# Patient Record
Sex: FEMALE | Race: WHITE | NOT HISPANIC OR LATINO | Employment: FULL TIME | URBAN - METROPOLITAN AREA
[De-identification: names, ages, dates, MRNs, and addresses within clinical notes are randomized per-mention and may not be internally consistent; named-entity substitution may affect disease eponyms.]

---

## 2017-11-09 ENCOUNTER — GENERIC CONVERSION - ENCOUNTER (OUTPATIENT)
Dept: OTHER | Facility: OTHER | Age: 27
End: 2017-11-09

## 2017-12-26 ENCOUNTER — GENERIC CONVERSION - ENCOUNTER (OUTPATIENT)
Dept: OTHER | Facility: OTHER | Age: 27
End: 2017-12-26

## 2018-01-22 VITALS
TEMPERATURE: 98.3 F | HEIGHT: 62 IN | BODY MASS INDEX: 30.57 KG/M2 | DIASTOLIC BLOOD PRESSURE: 60 MMHG | HEART RATE: 75 BPM | SYSTOLIC BLOOD PRESSURE: 120 MMHG | WEIGHT: 166.13 LBS

## 2018-01-24 VITALS
TEMPERATURE: 99.9 F | HEIGHT: 62 IN | WEIGHT: 164 LBS | SYSTOLIC BLOOD PRESSURE: 118 MMHG | BODY MASS INDEX: 30.18 KG/M2 | DIASTOLIC BLOOD PRESSURE: 72 MMHG

## 2019-05-08 ENCOUNTER — OFFICE VISIT (OUTPATIENT)
Dept: FAMILY MEDICINE CLINIC | Facility: CLINIC | Age: 29
End: 2019-05-08
Payer: COMMERCIAL

## 2019-05-08 VITALS
SYSTOLIC BLOOD PRESSURE: 124 MMHG | BODY MASS INDEX: 30.73 KG/M2 | HEIGHT: 62 IN | WEIGHT: 167 LBS | DIASTOLIC BLOOD PRESSURE: 82 MMHG | TEMPERATURE: 98.8 F

## 2019-05-08 DIAGNOSIS — H61.23 IMPACTED CERUMEN OF BOTH EARS: Primary | ICD-10-CM

## 2019-05-08 PROBLEM — Z01.419 ENCOUNTER FOR GYNECOLOGICAL EXAMINATION WITHOUT ABNORMAL FINDING: Status: ACTIVE | Noted: 2017-08-03

## 2019-05-08 PROBLEM — J06.9 ACUTE UPPER RESPIRATORY INFECTION: Status: ACTIVE | Noted: 2017-12-26

## 2019-05-08 PROCEDURE — 99213 OFFICE O/P EST LOW 20 MIN: CPT | Performed by: FAMILY MEDICINE

## 2019-05-15 ENCOUNTER — OFFICE VISIT (OUTPATIENT)
Dept: FAMILY MEDICINE CLINIC | Facility: CLINIC | Age: 29
End: 2019-05-15
Payer: COMMERCIAL

## 2019-05-15 VITALS
WEIGHT: 166 LBS | SYSTOLIC BLOOD PRESSURE: 128 MMHG | HEIGHT: 62 IN | BODY MASS INDEX: 30.55 KG/M2 | TEMPERATURE: 98.3 F | DIASTOLIC BLOOD PRESSURE: 80 MMHG

## 2019-05-15 DIAGNOSIS — H61.23 IMPACTED CERUMEN OF BOTH EARS: ICD-10-CM

## 2019-05-15 DIAGNOSIS — L98.9 CHANGING SKIN LESION: Primary | ICD-10-CM

## 2019-05-15 PROCEDURE — 99213 OFFICE O/P EST LOW 20 MIN: CPT | Performed by: FAMILY MEDICINE

## 2019-05-15 PROCEDURE — 3008F BODY MASS INDEX DOCD: CPT | Performed by: FAMILY MEDICINE

## 2019-05-15 PROCEDURE — 11300 SHAVE SKIN LESION 0.5 CM/<: CPT | Performed by: FAMILY MEDICINE

## 2019-05-16 PROCEDURE — 88304 TISSUE EXAM BY PATHOLOGIST: CPT | Performed by: PATHOLOGY

## 2019-08-20 ENCOUNTER — OFFICE VISIT (OUTPATIENT)
Dept: FAMILY MEDICINE CLINIC | Facility: CLINIC | Age: 29
End: 2019-08-20
Payer: COMMERCIAL

## 2019-08-20 VITALS
DIASTOLIC BLOOD PRESSURE: 72 MMHG | HEIGHT: 62 IN | HEART RATE: 87 BPM | SYSTOLIC BLOOD PRESSURE: 128 MMHG | BODY MASS INDEX: 31.28 KG/M2 | WEIGHT: 170 LBS | RESPIRATION RATE: 99 BRPM

## 2019-08-20 DIAGNOSIS — H65.23 BILATERAL CHRONIC SEROUS OTITIS MEDIA: Primary | ICD-10-CM

## 2019-08-20 PROCEDURE — 99213 OFFICE O/P EST LOW 20 MIN: CPT | Performed by: FAMILY MEDICINE

## 2019-08-20 PROCEDURE — 3008F BODY MASS INDEX DOCD: CPT | Performed by: FAMILY MEDICINE

## 2019-08-20 NOTE — PROGRESS NOTES
Assessment/Plan:    78-year-old woman with:  Serous TM effusion  Symptoms great to her left than right  Encouraged patient to restart her Flonase and to use times 2-3 weeks and to call back if not improving or worsening    No problem-specific Assessment & Plan notes found for this encounter  Diagnoses and all orders for this visit:    Bilateral chronic serous otitis media          Subjective:      Patient ID: Cirilo Hallman is a 30 y o  female  Patient is a 78-year-old woman who presents for follow-up on left ear infection  She admits that she was given antibiotics from the urgent care and she feels about 80% better but does get some popping and block sensation still on her left ear  No fevers chills nausea vomiting  The following portions of the patient's history were reviewed and updated as appropriate: allergies, current medications, past family history, past medical history, past social history, past surgical history and problem list     Review of Systems   Constitutional: Negative  HENT: Positive for hearing loss  Eyes: Negative  Respiratory: Negative  Cardiovascular: Negative  Gastrointestinal: Negative  Endocrine: Negative  Genitourinary: Negative  Musculoskeletal: Negative  Allergic/Immunologic: Negative  Neurological: Negative  Hematological: Negative  Psychiatric/Behavioral: Negative  All other systems reviewed and are negative  Objective:      /72 (BP Location: Right arm, Patient Position: Sitting, Cuff Size: Standard)   Pulse 87   Resp (!) 99   Ht 5' 2" (1 575 m)   Wt 77 1 kg (170 lb)   LMP 07/20/2019   BMI 31 09 kg/m²          Physical Exam   Constitutional: She is oriented to person, place, and time  She appears well-developed and well-nourished  HENT:   Head: Atraumatic     Right Ear: External ear normal    Left Ear: External ear normal    Serous TM effusion left greater than right   Eyes: Pupils are equal, round, and reactive to light  Conjunctivae and EOM are normal    Neck: Normal range of motion  Pulmonary/Chest: Effort normal  No respiratory distress  Abdominal: She exhibits no distension  Musculoskeletal: Normal range of motion  Neurological: She is alert and oriented to person, place, and time  No cranial nerve deficit  Skin: Skin is warm and dry  Psychiatric: She has a normal mood and affect  Her behavior is normal  Judgment and thought content normal          BMI Counseling: Body mass index is 31 09 kg/m²  Discussed the patient's BMI with her  The BMI is above average  BMI counseling and education was provided to the patient  Nutrition recommendations include 3-5 servings of fruits/vegetables daily  Exercise recommendations include moderate aerobic physical activity for 150 minutes/week

## 2019-10-13 ENCOUNTER — HOSPITAL ENCOUNTER (OUTPATIENT)
Dept: MRI IMAGING | Facility: HOSPITAL | Age: 29
Discharge: HOME | End: 2019-10-13
Attending: SPECIALIST
Payer: COMMERCIAL

## 2019-10-13 DIAGNOSIS — H93.19 TINNITUS: ICD-10-CM

## 2019-10-13 DIAGNOSIS — H93.A1 PULSATILE TINNITUS OF RIGHT EAR: ICD-10-CM

## 2019-10-13 PROCEDURE — 70544 MR ANGIOGRAPHY HEAD W/O DYE: CPT

## 2019-10-13 PROCEDURE — 70547 MR ANGIOGRAPHY NECK W/O DYE: CPT

## 2019-10-13 PROCEDURE — A9585 GADOBUTROL INJECTION: HCPCS | Performed by: SPECIALIST

## 2019-10-13 PROCEDURE — 70553 MRI BRAIN STEM W/O & W/DYE: CPT

## 2019-10-13 RX ADMIN — GADOBUTROL 8 ML: 604.72 INJECTION INTRAVENOUS at 10:54

## 2020-12-18 ENCOUNTER — TELEPHONE (OUTPATIENT)
Dept: POSTPARTUM | Facility: CLINIC | Age: 30
End: 2020-12-18

## 2020-12-23 ENCOUNTER — OFFICE VISIT (OUTPATIENT)
Dept: POSTPARTUM | Facility: CLINIC | Age: 30
End: 2020-12-23
Payer: COMMERCIAL

## 2020-12-23 VITALS — SYSTOLIC BLOOD PRESSURE: 114 MMHG | DIASTOLIC BLOOD PRESSURE: 72 MMHG

## 2020-12-23 DIAGNOSIS — Z71.89 ENCOUNTER FOR BREAST FEEDING COUNSELING: Primary | ICD-10-CM

## 2020-12-23 PROCEDURE — 99404 PREV MED CNSL INDIV APPRX 60: CPT | Performed by: PEDIATRICS

## 2020-12-23 RX ORDER — MULTIVIT WITH MINERALS/LUTEIN
250 TABLET ORAL DAILY
COMMUNITY
Start: 2020-12-04 | End: 2021-12-04

## 2020-12-23 RX ORDER — CALCIUM CARB/VITAMIN D3/VIT K1 650MG-12.5
2 TABLET,CHEWABLE ORAL DAILY
COMMUNITY

## 2020-12-23 RX ORDER — FERROUS SULFATE 325(65) MG
325 TABLET ORAL DAILY
COMMUNITY
Start: 2020-12-04 | End: 2021-12-04

## 2020-12-31 ENCOUNTER — OFFICE VISIT (OUTPATIENT)
Dept: POSTPARTUM | Facility: CLINIC | Age: 30
End: 2020-12-31
Payer: COMMERCIAL

## 2020-12-31 DIAGNOSIS — Z71.89 ENCOUNTER FOR BREAST FEEDING COUNSELING: Primary | ICD-10-CM

## 2020-12-31 PROCEDURE — 99404 PREV MED CNSL INDIV APPRX 60: CPT | Performed by: PEDIATRICS

## 2020-12-31 RX ORDER — ACETAMINOPHEN AND CODEINE PHOSPHATE 120; 12 MG/5ML; MG/5ML
0.35 SOLUTION ORAL DAILY
COMMUNITY
Start: 2020-12-31 | End: 2021-12-31

## 2021-02-10 ENCOUNTER — TELEPHONE (OUTPATIENT)
Dept: POSTPARTUM | Facility: CLINIC | Age: 31
End: 2021-02-10

## 2021-02-10 NOTE — TELEPHONE ENCOUNTER
Evelyn Moses has been taking a supplement to help with her milk supply  She has started taking an OCP that does not contain estrogen  She has found information about the supplement she is taking impacting estrogen and wants to know if she should be concerned about the effectiveness of her OCP if she continues taking these supplements  I discussed with Evelyn Moses that there is no conclusive evidence about how supplements may impact the efficacy of her OCP's  I explained that even though her OCP does not contain estrogens, if the supplements she is taking alters her body's estrogens potentially this could impact how her medication is working  I reviewed with her that it is well researched and documented that antibiotics can decrease the effectiveness of her OCP's and encouraged her to use alternative forms of contraception if she is ever treated with antibiotics  I encourage Evelyn Moses to call with any additional questions or concerns

## 2021-02-10 NOTE — TELEPHONE ENCOUNTER
Anais Rubalcava called - she is asking if there are any lactation supplements that would interfere with the mini BC pill

## 2021-02-11 ENCOUNTER — TELEPHONE (OUTPATIENT)
Dept: FAMILY MEDICINE CLINIC | Facility: CLINIC | Age: 31
End: 2021-02-11

## 2021-03-24 ENCOUNTER — TELEPHONE (OUTPATIENT)
Dept: POSTPARTUM | Facility: CLINIC | Age: 31
End: 2021-03-24

## 2021-03-24 NOTE — TELEPHONE ENCOUNTER
Called Jennifer - Provided info from CDC about COVID and breastfeeding - encouraged to get vaccine as it is considered safe

## 2021-03-24 NOTE — TELEPHONE ENCOUNTER
Hira Mitchell called - looking for information on getting the Covid vaccine while still breastfeeding  She is scheduled for this Friday

## 2021-03-31 DIAGNOSIS — Z23 ENCOUNTER FOR IMMUNIZATION: ICD-10-CM

## 2021-10-20 ENCOUNTER — OFFICE VISIT (OUTPATIENT)
Dept: URGENT CARE | Facility: MEDICAL CENTER | Age: 31
End: 2021-10-20
Payer: COMMERCIAL

## 2021-10-20 ENCOUNTER — TELEPHONE (OUTPATIENT)
Dept: URGENT CARE | Facility: MEDICAL CENTER | Age: 31
End: 2021-10-20

## 2021-10-20 VITALS
WEIGHT: 156 LBS | SYSTOLIC BLOOD PRESSURE: 110 MMHG | BODY MASS INDEX: 28.53 KG/M2 | DIASTOLIC BLOOD PRESSURE: 72 MMHG | TEMPERATURE: 97.9 F | OXYGEN SATURATION: 96 % | HEART RATE: 93 BPM | RESPIRATION RATE: 18 BRPM

## 2021-10-20 DIAGNOSIS — J02.9 SORE THROAT: Primary | ICD-10-CM

## 2021-10-20 LAB
S PYO AG THROAT QL: NEGATIVE
SARS-COV-2 RNA RESP QL NAA+PROBE: NEGATIVE

## 2021-10-20 PROCEDURE — G0382 LEV 3 HOSP TYPE B ED VISIT: HCPCS | Performed by: PHYSICIAN ASSISTANT

## 2021-10-20 PROCEDURE — U0003 INFECTIOUS AGENT DETECTION BY NUCLEIC ACID (DNA OR RNA); SEVERE ACUTE RESPIRATORY SYNDROME CORONAVIRUS 2 (SARS-COV-2) (CORONAVIRUS DISEASE [COVID-19]), AMPLIFIED PROBE TECHNIQUE, MAKING USE OF HIGH THROUGHPUT TECHNOLOGIES AS DESCRIBED BY CMS-2020-01-R: HCPCS | Performed by: PHYSICIAN ASSISTANT

## 2021-10-20 PROCEDURE — 87880 STREP A ASSAY W/OPTIC: CPT | Performed by: PHYSICIAN ASSISTANT

## 2021-10-20 PROCEDURE — 87070 CULTURE OTHR SPECIMN AEROBIC: CPT | Performed by: PHYSICIAN ASSISTANT

## 2021-10-20 PROCEDURE — U0005 INFEC AGEN DETEC AMPLI PROBE: HCPCS | Performed by: PHYSICIAN ASSISTANT

## 2021-10-21 ENCOUNTER — TELEPHONE (OUTPATIENT)
Dept: URGENT CARE | Facility: CLINIC | Age: 31
End: 2021-10-21

## 2021-10-22 LAB — BACTERIA THROAT CULT: NORMAL

## 2022-11-08 ENCOUNTER — TELEPHONE (OUTPATIENT)
Dept: FAMILY MEDICINE CLINIC | Facility: CLINIC | Age: 32
End: 2022-11-08

## 2022-11-08 NOTE — TELEPHONE ENCOUNTER
----- Message from Bambi Limon MA sent at 11/2/2022  8:12 AM EDT -----  Regarding: RE: care gap request  Radha Gallegos Please send as a telephone encounter with "Patient Attribution" as reason for call  Thanks  ----- Message -----  From: Keturah Chi  Sent: 11/2/2022   7:18 AM EDT  To: [facility]  Subject: care gap request                                 11/02/22 7:17 AM    Hello, Patient is no longer seeing Dr Pool PCP needs to be removed      Thank you,  Keturah Chi  [facility]

## 2022-11-08 NOTE — TELEPHONE ENCOUNTER
----- Message from Marquita Powell PA-C sent at 10/4/2022  9:16 AM EDT -----  Regarding: Last seen 08/20/2019  Please update PCP for Dr Jens Tom since the patient is no longer considered established since last seen 08/20/2019    Thank you

## 2023-12-04 ENCOUNTER — TELEPHONE (OUTPATIENT)
Dept: FAMILY MEDICINE CLINIC | Facility: CLINIC | Age: 33
End: 2023-12-04

## 2023-12-05 ENCOUNTER — OFFICE VISIT (OUTPATIENT)
Dept: FAMILY MEDICINE CLINIC | Facility: CLINIC | Age: 33
End: 2023-12-05
Payer: COMMERCIAL

## 2023-12-05 VITALS
BODY MASS INDEX: 33.31 KG/M2 | HEIGHT: 62 IN | TEMPERATURE: 99.2 F | OXYGEN SATURATION: 99 % | WEIGHT: 181 LBS | RESPIRATION RATE: 16 BRPM | SYSTOLIC BLOOD PRESSURE: 122 MMHG | DIASTOLIC BLOOD PRESSURE: 80 MMHG | HEART RATE: 68 BPM

## 2023-12-05 DIAGNOSIS — L72.0 EPIDERMAL CYST: Primary | ICD-10-CM

## 2023-12-05 DIAGNOSIS — Z00.00 ROUTINE ADULT HEALTH MAINTENANCE: ICD-10-CM

## 2023-12-05 PROCEDURE — 99213 OFFICE O/P EST LOW 20 MIN: CPT | Performed by: FAMILY MEDICINE

## 2023-12-05 PROCEDURE — 99395 PREV VISIT EST AGE 18-39: CPT | Performed by: FAMILY MEDICINE

## 2023-12-05 RX ORDER — CEPHALEXIN 500 MG/1
500 CAPSULE ORAL EVERY 8 HOURS SCHEDULED
Qty: 21 CAPSULE | Refills: 0 | Status: SHIPPED | OUTPATIENT
Start: 2023-12-05 | End: 2023-12-12

## 2023-12-07 NOTE — PROGRESS NOTES
Assessment/Plan:    25 y/o woman with: cyst with annual well visit. Will treat with keflex, pt plans to think about referral to surgeon. Discussed supportive care and return parameters. Regarding Annual well visit. Discussed various safety and health maintenance issues including healthy diet like the Mediterranean diet, exercise, ample sleep, stress reduction, and healthy weight as tolerated. Discussed supportive care and return parameters. No problem-specific Assessment & Plan notes found for this encounter. Diagnoses and all orders for this visit:    Epidermal cyst  -     cephalexin (KEFLEX) 500 mg capsule; Take 1 capsule (500 mg total) by mouth every 8 (eight) hours for 7 days  -     CBC and differential; Future  -     Comprehensive metabolic panel; Future  -     TSH, 3rd generation with Free T4 reflex; Future  -     Lipid Panel with Direct LDL reflex; Future  -     Apolipoprotein B; Future    Routine adult health maintenance  -     CBC and differential; Future  -     Comprehensive metabolic panel; Future  -     TSH, 3rd generation with Free T4 reflex; Future  -     Lipid Panel with Direct LDL reflex; Future  -     Apolipoprotein B; Future          Subjective:     Chief Complaint   Patient presents with    Physical Exam    Back     Patient has lump on back. Patient states has been on back for years, Patient states that lump started to burn and hurt on Friday. JT    BMI      Due. Patient ID: Jackqulyn Sicard is a 26 y.o. female. Patient is a 25 y/o woman who presents complaining of raised lesion on her back, now tender. No drainage. No fevers chills nausea or vomiting. Pt also here for annual well visit admits being active eats well denies acute complaints.         The following portions of the patient's history were reviewed and updated as appropriate: allergies, current medications, past family history, past medical history, past social history, past surgical history and problem list.    Review of Systems   Constitutional: Negative. HENT: Negative. Eyes: Negative. Respiratory: Negative. Cardiovascular: Negative. Gastrointestinal: Negative. Endocrine: Negative. Genitourinary: Negative. Musculoskeletal: Negative. Allergic/Immunologic: Negative. Neurological: Negative. Hematological: Negative. Psychiatric/Behavioral: Negative. All other systems reviewed and are negative. Objective:      /80 (BP Location: Left arm, Patient Position: Sitting, Cuff Size: Standard)   Pulse 68   Temp 99.2 °F (37.3 °C) (Tympanic)   Resp 16   Ht 5' 2" (1.575 m)   Wt 82.1 kg (181 lb)   SpO2 99%   BMI 33.11 kg/m²          Physical Exam  Constitutional:       Appearance: She is well-developed. HENT:      Head: Atraumatic. Right Ear: External ear normal.      Left Ear: External ear normal.   Eyes:      Conjunctiva/sclera: Conjunctivae normal.      Pupils: Pupils are equal, round, and reactive to light. Cardiovascular:      Rate and Rhythm: Normal rate and regular rhythm. Heart sounds: Normal heart sounds. Pulmonary:      Effort: Pulmonary effort is normal. No respiratory distress. Breath sounds: Normal breath sounds. Abdominal:      General: Bowel sounds are normal. There is no distension. Palpations: Abdomen is soft. Tenderness: There is no abdominal tenderness. There is no guarding or rebound. Musculoskeletal:         General: Normal range of motion. Cervical back: Normal range of motion. Skin:     General: Skin is warm and dry. Comments: 2cm tender raised lesion upper back   Neurological:      Mental Status: She is alert and oriented to person, place, and time. Cranial Nerves: No cranial nerve deficit. Psychiatric:         Behavior: Behavior normal.         Thought Content: Thought content normal.         Judgment: Judgment normal.         BMI Counseling: Body mass index is 33.11 kg/m².  The BMI is above normal. Nutrition recommendations include encouraging healthy choices of fruits and vegetables. Exercise recommendations include moderate physical activity 150 minutes/week. Rationale for BMI follow-up plan is due to patient being overweight or obese. Depression Screening and Follow-up Plan: Patient was screened for depression during today's encounter. They screened negative with a PHQ-2 score of 1.

## 2023-12-13 ENCOUNTER — OFFICE VISIT (OUTPATIENT)
Dept: FAMILY MEDICINE CLINIC | Facility: CLINIC | Age: 33
End: 2023-12-13
Payer: COMMERCIAL

## 2023-12-13 VITALS
WEIGHT: 183.2 LBS | DIASTOLIC BLOOD PRESSURE: 102 MMHG | RESPIRATION RATE: 21 BRPM | TEMPERATURE: 98.7 F | HEART RATE: 75 BPM | HEIGHT: 62 IN | BODY MASS INDEX: 33.71 KG/M2 | OXYGEN SATURATION: 99 % | SYSTOLIC BLOOD PRESSURE: 150 MMHG

## 2023-12-13 DIAGNOSIS — L72.0 EPIDERMAL CYST: Primary | ICD-10-CM

## 2023-12-13 PROCEDURE — 99213 OFFICE O/P EST LOW 20 MIN: CPT | Performed by: FAMILY MEDICINE

## 2023-12-13 PROCEDURE — 87205 SMEAR GRAM STAIN: CPT | Performed by: FAMILY MEDICINE

## 2023-12-13 PROCEDURE — 87070 CULTURE OTHR SPECIMN AEROBIC: CPT | Performed by: FAMILY MEDICINE

## 2023-12-13 RX ORDER — CEPHALEXIN 500 MG/1
500 CAPSULE ORAL 2 TIMES DAILY
Qty: 14 CAPSULE | Refills: 0 | Status: SHIPPED | OUTPATIENT
Start: 2023-12-13 | End: 2023-12-20

## 2023-12-13 RX ORDER — SULFAMETHOXAZOLE AND TRIMETHOPRIM 800; 160 MG/1; MG/1
1 TABLET ORAL 2 TIMES DAILY
Qty: 14 TABLET | Refills: 0 | Status: SHIPPED | OUTPATIENT
Start: 2023-12-13 | End: 2023-12-20

## 2023-12-14 ENCOUNTER — TELEPHONE (OUTPATIENT)
Dept: ADMINISTRATIVE | Facility: OTHER | Age: 33
End: 2023-12-14

## 2023-12-14 NOTE — TELEPHONE ENCOUNTER
Upon review of the In Basket request we were able to locate, review, and update the patient chart as requested for Pap Smear (HPV) aka Cervical Cancer Screening. Any additional questions or concerns should be emailed to the Practice Liaisons via the appropriate education email address, please do not reply via In Basket.     Thank you  Natividad De MA

## 2023-12-14 NOTE — TELEPHONE ENCOUNTER
----- Message from Kerri De La Rosa sent at 12/13/2023  1:55 PM EST -----  Regarding: Update HM (PAP)  Can you please update patient's HM regarding PAP? Results are under labs tab from 9/2023. Thank you.

## 2023-12-18 ENCOUNTER — CONSULT (OUTPATIENT)
Dept: SURGERY | Facility: CLINIC | Age: 33
End: 2023-12-18
Payer: COMMERCIAL

## 2023-12-18 VITALS
TEMPERATURE: 97.7 F | HEIGHT: 62 IN | SYSTOLIC BLOOD PRESSURE: 140 MMHG | HEART RATE: 76 BPM | BODY MASS INDEX: 33.31 KG/M2 | WEIGHT: 181 LBS | DIASTOLIC BLOOD PRESSURE: 88 MMHG

## 2023-12-18 DIAGNOSIS — L72.0 EPIDERMAL CYST: ICD-10-CM

## 2023-12-18 PROBLEM — Z00.00 ROUTINE ADULT HEALTH MAINTENANCE: Status: RESOLVED | Noted: 2023-12-05 | Resolved: 2023-12-18

## 2023-12-18 PROBLEM — Z01.419 ENCOUNTER FOR GYNECOLOGICAL EXAMINATION WITHOUT ABNORMAL FINDING: Status: RESOLVED | Noted: 2017-08-03 | Resolved: 2023-12-18

## 2023-12-18 LAB
BACTERIA WND AEROBE CULT: NO GROWTH
GRAM STN SPEC: NORMAL
GRAM STN SPEC: NORMAL

## 2023-12-18 PROCEDURE — 99203 OFFICE O/P NEW LOW 30 MIN: CPT | Performed by: PHYSICIAN ASSISTANT

## 2023-12-18 NOTE — RESULT ENCOUNTER NOTE
Please call patient. Wound cx was negative, if pt is not responding to antibiotics she should call back

## 2023-12-18 NOTE — PROGRESS NOTES
Assessment/Plan:    34 y/o woman with: infected cyst. Pt requested additional drainage if possible. Discussed treatment options, ethyl chloride spray was used topically and lancing by 11 blade was performed and more purulent drainage was expressed. Will add Bactrim to antibiotic. Discussed supportive care and return parameters.     No problem-specific Assessment & Plan notes found for this encounter.       Diagnoses and all orders for this visit:    Epidermal cyst  -     sulfamethoxazole-trimethoprim (BACTRIM DS) 800-160 mg per tablet; Take 1 tablet by mouth 2 (two) times a day for 7 days  -     cephalexin (KEFLEX) 500 mg capsule; Take 1 capsule (500 mg total) by mouth 2 (two) times a day for 7 days  -     Ambulatory Referral to General Surgery; Future  -     Cancel: Culture, Aerobic and Anaerobic w/Gram Stain; Future  -     Cancel: Culture, Aerobic and Anaerobic w/Gram Stain; Future  -     Wound culture and Gram stain          Subjective:     Chief Complaint   Patient presents with    Cyst     Mid-upper back. Has become infected, draining and painful x 3-4 days.   Patient has been using heat to extract pus. Also been using advil to help with pain.        Patient ID: Jennifer Brewster is a 33 y.o. female.    Patient is a 34 y/o woman who presents for follow-up infected cyst that has gotten slightly more tender and has began draining spontaneously no fevers chills nausea or vomiting.        The following portions of the patient's history were reviewed and updated as appropriate: allergies, current medications, past family history, past medical history, past social history, past surgical history and problem list.    Review of Systems   Constitutional: Negative.    HENT: Negative.     Eyes: Negative.    Respiratory: Negative.     Cardiovascular: Negative.    Gastrointestinal: Negative.    Endocrine: Negative.    Genitourinary: Negative.    Musculoskeletal: Negative.    Skin:  Positive for wound.  "  Allergic/Immunologic: Negative.    Neurological: Negative.    Hematological: Negative.    Psychiatric/Behavioral: Negative.     All other systems reviewed and are negative.        Objective:      BP (!) 150/102 (BP Location: Left arm, Patient Position: Sitting, Cuff Size: Standard)   Pulse 75   Temp 98.7 °F (37.1 °C) (Temporal)   Resp 21   Ht 5' 2\" (1.575 m)   Wt 83.1 kg (183 lb 3.2 oz)   SpO2 99%   BMI 33.51 kg/m²          Physical Exam  Constitutional:       Appearance: She is well-developed.   HENT:      Head: Atraumatic.      Right Ear: External ear normal.      Left Ear: External ear normal.   Eyes:      Conjunctiva/sclera: Conjunctivae normal.      Pupils: Pupils are equal, round, and reactive to light.   Pulmonary:      Effort: Pulmonary effort is normal. No respiratory distress.   Abdominal:      General: There is no distension.   Musculoskeletal:         General: Normal range of motion.      Cervical back: Normal range of motion.   Skin:     General: Skin is warm and dry.      Findings: Lesion present.      Comments: 3cm erythematous infected cyst on upper back with spontaneous purulent drainage.   Neurological:      Mental Status: She is alert and oriented to person, place, and time.      Cranial Nerves: No cranial nerve deficit.   Psychiatric:         Behavior: Behavior normal.         Thought Content: Thought content normal.         Judgment: Judgment normal.         "

## 2023-12-18 NOTE — PROGRESS NOTES
Assessment/Plan:   Jennifer Brewster is a 33 y.o.female who is here for   Chief Complaint   Patient presents with    Cyst     Upper back by left shoulder blade. It has been there for years and got infected. It did pop and everything drained. Patient said now there is a hole in her back.          On exam found to have ruptured sebaceous cyst that has been thoroughly cleaned and looks great. It just needs to heal from the bottom up. I do not see any cyst wall so most likely this will heal nicely on its own without coming back.     Plan: The area where cyst was is very clean, some minor debris was removed from the bottom of the wound. Area is clean, no erythema, covered with a band-aid. We discussed wound care. Follow up PRN.        _______________________________________________________  CC:Cyst (Upper back by left shoulder blade. It has been there for years and got infected. It did pop and everything drained. Patient said now there is a hole in her back. )  .    HPI:  Jennifer Brewster is a 33 y.o.female who was referred for evaluation of Cyst (Upper back by left shoulder blade. It has been there for years and got infected. It did pop and everything drained. Patient said now there is a hole in her back. )  .    Currently patient reports patient states there was a rupture of the left back cyst where her mom debrided the cyst and had a lot removed. No antibiotics were taken. There has been some discharge but very minimal now. No pain and no fevers. No discharge on today's bandaging which was changed yesterday night. This has never opened prior.      ROS:  General ROS: negative  negative for - chills, fatigue, fever or night sweats, weight loss  Respiratory ROS: no cough, shortness of breath, or wheezing  Cardiovascular ROS: no chest pain or dyspnea on exertion  Genito-Urinary ROS: no dysuria, trouble voiding, or hematuria  Musculoskeletal ROS: negative for - gait disturbance, joint pain or muscle pain  Neurological  ROS: no TIA or stroke symptoms  Skin ROS: See HPI  GI ROS: see HPI  Skin ROS: no new rashes or lesions   Lymphatic ROS: no new adenopathy noted by pt.   Psy ROS: no new mental or behavioral disturbances       Patient Active Problem List   Diagnosis    Acute upper respiratory infection    Encounter for gynecological examination without abnormal finding    Impacted cerumen of both ears    Postcoital bleeding    Surveillance for birth control, oral contraceptives    Changing skin lesion    Bilateral chronic serous otitis media    Epidermal cyst    Routine adult health maintenance         Allergies:  Latex      Current Outpatient Medications:     Calcium-Vitamin D-Vitamin K (Viactiv Calcium Plus D) 650-12.5-40 MG-MCG-MCG CHEW, Chew 2 tablets daily, Disp: , Rfl:     cephalexin (KEFLEX) 500 mg capsule, Take 1 capsule (500 mg total) by mouth 2 (two) times a day for 7 days, Disp: 14 capsule, Rfl: 0    sulfamethoxazole-trimethoprim (BACTRIM DS) 800-160 mg per tablet, Take 1 tablet by mouth 2 (two) times a day for 7 days, Disp: 14 tablet, Rfl: 0    ascorbic acid (VITAMIN C) 250 mg tablet, Take 250 mg by mouth daily, Disp: , Rfl:     ferrous sulfate 325 (65 Fe) mg tablet, Take 325 mg by mouth daily, Disp: , Rfl:     norethindrone (MICRONOR) 0.35 MG tablet, Take 0.35 mg by mouth daily, Disp: , Rfl:     Past Medical History:   Diagnosis Date    Migraine     Otitis media 8/11/19    Tinnitus        Past Surgical History:   Procedure Laterality Date    DILATION AND CURETTAGE OF UTERUS      EYE SURGERY  12/2015    Lasik    LASIK      WISDOM TOOTH EXTRACTION         Family History   Problem Relation Age of Onset    Heart disease Father     Heart disease Maternal Grandfather     Liver cancer Paternal Grandmother     Cancer Paternal Grandmother         Pancreatic    Other Paternal Grandfather         Angioid streaks     Stroke Paternal Grandfather     Heart disease Family     Migraines Mother     Thyroid disease Mother          Hypothyroidism    Arthritis Mother     Hearing loss Mother     Arthritis Maternal Grandmother     Hearing loss Maternal Grandmother         reports that she has never smoked. She has never used smokeless tobacco. She reports current alcohol use. She reports that she does not use drugs.    Vitals:    12/18/23 1357   BP: 140/88   Pulse: 76   Temp: 97.7 °F (36.5 °C)        PHYSICAL EXAM  General Appearance:    Alert, cooperative, no distress,    Head:    Normocephalic without obvious abnormality   Eyes:    PERRL, conjunctiva/corneas clear     Neck:   Supple, no adenopathy, no JVD   Back:      Lungs:      Heart:     Abdomen:      Extremities:    Psych:   Normal Affect, AOx3.    Neurologic:  Skin:   CNII-XII intact. Strength symmetric, speech intact    Warm, dry, intact, no visible rashes or lesions except as follows: open wound where cyst burst, wound bed is pink and clean with minimal caseous debris which was removed. Covered with bandaid.                  Zoie Shirley PA-C    Date: 12/18/2023 Time: 2:00 PM

## 2024-02-21 PROBLEM — H61.23 IMPACTED CERUMEN OF BOTH EARS: Status: RESOLVED | Noted: 2017-11-09 | Resolved: 2024-02-21

## 2024-02-21 PROBLEM — J06.9 ACUTE UPPER RESPIRATORY INFECTION: Status: RESOLVED | Noted: 2017-12-26 | Resolved: 2024-02-21

## 2024-08-30 ENCOUNTER — OFFICE VISIT (OUTPATIENT)
Dept: FAMILY MEDICINE CLINIC | Facility: CLINIC | Age: 34
End: 2024-08-30
Payer: COMMERCIAL

## 2024-08-30 VITALS
WEIGHT: 186.6 LBS | HEART RATE: 113 BPM | TEMPERATURE: 98.4 F | OXYGEN SATURATION: 96 % | BODY MASS INDEX: 34.34 KG/M2 | DIASTOLIC BLOOD PRESSURE: 100 MMHG | SYSTOLIC BLOOD PRESSURE: 124 MMHG | HEIGHT: 62 IN

## 2024-08-30 DIAGNOSIS — G56.01 CARPAL TUNNEL SYNDROME OF RIGHT WRIST: ICD-10-CM

## 2024-08-30 DIAGNOSIS — J20.8 ACUTE BRONCHITIS DUE TO OTHER SPECIFIED ORGANISMS: Primary | ICD-10-CM

## 2024-08-30 PROCEDURE — 3725F SCREEN DEPRESSION PERFORMED: CPT | Performed by: FAMILY MEDICINE

## 2024-08-30 PROCEDURE — 99214 OFFICE O/P EST MOD 30 MIN: CPT | Performed by: FAMILY MEDICINE

## 2024-08-30 RX ORDER — NORGESTREL-ETHINYL ESTRADIOL 0.3-0.03MG
TABLET ORAL
COMMUNITY
Start: 2024-05-23

## 2024-08-30 RX ORDER — FLUTICASONE PROPIONATE AND SALMETEROL 250; 50 UG/1; UG/1
1 POWDER RESPIRATORY (INHALATION) 2 TIMES DAILY
Qty: 60 BLISTER | Refills: 1 | Status: SHIPPED | OUTPATIENT
Start: 2024-08-30

## 2024-08-30 RX ORDER — PREDNISONE 20 MG/1
40 TABLET ORAL DAILY
Qty: 10 TABLET | Refills: 0 | Status: SHIPPED | OUTPATIENT
Start: 2024-08-30 | End: 2024-09-04

## 2024-09-02 PROBLEM — G56.01 CARPAL TUNNEL SYNDROME OF RIGHT WRIST: Status: ACTIVE | Noted: 2024-09-02

## 2024-09-02 NOTE — PROGRESS NOTES
Assessment/Plan:    35 y/o woman with: acute bronchitis and carpal tunnel syndrome. Discussed supportive care and return parameters. Will give steroid burst and inhaler and refer to hand surgeon.    No problem-specific Assessment & Plan notes found for this encounter.       Diagnoses and all orders for this visit:    Acute bronchitis due to other specified organisms  -     Fluticasone-Salmeterol (Advair Diskus) 250-50 mcg/dose inhaler; Inhale 1 puff 2 (two) times a day Rinse mouth after use.  -     predniSONE 20 mg tablet; Take 2 tablets (40 mg total) by mouth daily for 5 days    Carpal tunnel syndrome of right wrist  -     Ambulatory Referral to Orthopedic Surgery; Future    Other orders  -     Cryselle-28 0.3-30 MG-MCG per tablet          Subjective:     Chief Complaint   Patient presents with    Numbness     Patient is here today complaining of numbness and tingling mostly in the right hand. Patient states it has been continuous for over a year. Patient states it getting worse over time.     Cough     Patient states she has constant dry cough for about 1.5 months. Patient states she doesn't have any other symptoms. She is just concerned about the cough. No further concerns, ng        Patient ID: Jennifer Brewster is a 34 y.o. female.    Patient is a 35 y/o woman who presents for follow-up on cough congestion wheezing no fevers chills nausea or vomiting. Pt also c/o carpal tunnel syndrome.    Cough  Associated symptoms include wheezing.       The following portions of the patient's history were reviewed and updated as appropriate: allergies, current medications, past family history, past medical history, past social history, past surgical history and problem list.    Review of Systems   Constitutional: Negative.    HENT:  Positive for congestion.    Eyes: Negative.    Respiratory:  Positive for cough and wheezing.    Cardiovascular: Negative.    Gastrointestinal: Negative.    Endocrine: Negative.   "  Genitourinary: Negative.    Musculoskeletal: Negative.    Allergic/Immunologic: Negative.    Neurological: Negative.    Hematological: Negative.    Psychiatric/Behavioral: Negative.     All other systems reviewed and are negative.        Objective:      /100 (BP Location: Right arm, Patient Position: Sitting, Cuff Size: Large)   Pulse (!) 113   Temp 98.4 °F (36.9 °C) (Temporal)   Ht 5' 2\" (1.575 m)   Wt 84.6 kg (186 lb 9.6 oz)   SpO2 96%   BMI 34.13 kg/m²          Physical Exam  Constitutional:       Appearance: She is well-developed.   HENT:      Head: Atraumatic.      Right Ear: External ear normal.      Left Ear: External ear normal.   Eyes:      Extraocular Movements: EOM normal.      Conjunctiva/sclera: Conjunctivae normal.      Pupils: Pupils are equal, round, and reactive to light.   Cardiovascular:      Rate and Rhythm: Normal rate and regular rhythm.      Heart sounds: Normal heart sounds.   Pulmonary:      Effort: Pulmonary effort is normal. No respiratory distress.      Breath sounds: Wheezing present.   Abdominal:      General: There is no distension.      Palpations: Abdomen is soft.      Tenderness: There is no abdominal tenderness. There is no guarding or rebound.   Musculoskeletal:         General: Normal range of motion.      Cervical back: Normal range of motion.   Skin:     General: Skin is warm and dry.   Neurological:      Mental Status: She is alert and oriented to person, place, and time.      Cranial Nerves: No cranial nerve deficit.   Psychiatric:         Mood and Affect: Mood and affect normal.         Behavior: Behavior normal.         Thought Content: Thought content normal.         Judgment: Judgment normal.         "

## 2024-09-10 ENCOUNTER — TELEPHONE (OUTPATIENT)
Age: 34
End: 2024-09-10

## 2024-09-10 DIAGNOSIS — B37.0 THRUSH: Primary | ICD-10-CM

## 2024-09-10 RX ORDER — NYSTATIN 100000/ML
500000 SUSPENSION, ORAL (FINAL DOSE FORM) ORAL 4 TIMES DAILY
Qty: 473 ML | Refills: 0 | Status: SHIPPED | OUTPATIENT
Start: 2024-09-10

## 2024-09-10 NOTE — TELEPHONE ENCOUNTER
Pt called to report what she believes may be a side effect from the recently prescribed Fluticasone inhaler.    She has been on it for about 2 weeks, but in the last 3/4 days her tongue has become covered in very sore red bumps.  She also reports a film/coating on her tongue that is effecting her taste.  She states she has been rinsing several times after taking the medication, as directed.    She also currently has a cold, which is making things worse.  She did schedule an OV for 9/13, but asked me to send a message also, unsure if she needed to be seen or not.  Please advise.

## 2024-09-13 ENCOUNTER — OFFICE VISIT (OUTPATIENT)
Dept: FAMILY MEDICINE CLINIC | Facility: CLINIC | Age: 34
End: 2024-09-13
Payer: COMMERCIAL

## 2024-09-13 VITALS
BODY MASS INDEX: 34.78 KG/M2 | TEMPERATURE: 97.5 F | SYSTOLIC BLOOD PRESSURE: 124 MMHG | WEIGHT: 189 LBS | HEART RATE: 92 BPM | RESPIRATION RATE: 16 BRPM | DIASTOLIC BLOOD PRESSURE: 80 MMHG | OXYGEN SATURATION: 98 % | HEIGHT: 62 IN

## 2024-09-13 DIAGNOSIS — B37.0 THRUSH: Primary | ICD-10-CM

## 2024-09-13 PROCEDURE — 99213 OFFICE O/P EST LOW 20 MIN: CPT | Performed by: FAMILY MEDICINE

## 2024-09-17 PROBLEM — B37.0 THRUSH: Status: ACTIVE | Noted: 2024-09-17

## 2024-09-17 NOTE — PROGRESS NOTES
"Assessment/Plan:    33 y/o woman with: thrush, improving, continue nystatin suspension. Discussed supportive care and return parameters.     No problem-specific Assessment & Plan notes found for this encounter.       Diagnoses and all orders for this visit:    Thrush          Subjective:     Chief Complaint   Patient presents with    Mouth Lesions     Pt states she had yellow/white color on her tongue for the past 1 week and she felt like her tongue was on fire. No other concerns. [facility]        Patient ID: Jennifer Brewster is a 34 y.o. female.    Patient is a 33 y/o woman who presents for follow-up on thrush she has started the nystatin and admits it is improving no fevers chills nausea or vomiting.    Mouth Lesions   Associated symptoms include mouth sores.       The following portions of the patient's history were reviewed and updated as appropriate: allergies, current medications, past family history, past medical history, past social history, past surgical history and problem list.    Review of Systems   Constitutional: Negative.    HENT:  Positive for mouth sores.    Eyes: Negative.    Respiratory: Negative.     Cardiovascular: Negative.    Gastrointestinal: Negative.    Endocrine: Negative.    Genitourinary: Negative.    Musculoskeletal: Negative.    Allergic/Immunologic: Negative.    Neurological: Negative.    Hematological: Negative.    Psychiatric/Behavioral: Negative.     All other systems reviewed and are negative.        Objective:      /80 (BP Location: Right arm, Patient Position: Sitting, Cuff Size: Standard)   Pulse 92   Temp 97.5 °F (36.4 °C) (Temporal)   Resp 16   Ht 5' 2\" (1.575 m)   Wt 85.7 kg (189 lb)   SpO2 98%   BMI 34.57 kg/m²          Physical Exam  Constitutional:       Appearance: She is well-developed.   HENT:      Head: Atraumatic.      Right Ear: External ear normal.      Left Ear: External ear normal.   Eyes:      Extraocular Movements: EOM normal.      Conjunctiva/sclera: " Conjunctivae normal.      Pupils: Pupils are equal, round, and reactive to light.   Cardiovascular:      Rate and Rhythm: Normal rate and regular rhythm.      Heart sounds: Normal heart sounds.   Pulmonary:      Effort: Pulmonary effort is normal. No respiratory distress.      Breath sounds: Normal breath sounds.   Abdominal:      General: There is no distension.      Palpations: Abdomen is soft.      Tenderness: There is no abdominal tenderness. There is no guarding or rebound.   Musculoskeletal:         General: Normal range of motion.      Cervical back: Normal range of motion.   Skin:     General: Skin is warm and dry.   Neurological:      Mental Status: She is alert and oriented to person, place, and time.      Cranial Nerves: No cranial nerve deficit.   Psychiatric:         Mood and Affect: Mood and affect normal.         Behavior: Behavior normal.         Thought Content: Thought content normal.         Judgment: Judgment normal.

## 2024-09-29 PROBLEM — J20.8 ACUTE BRONCHITIS DUE TO OTHER SPECIFIED ORGANISMS: Status: RESOLVED | Noted: 2024-08-30 | Resolved: 2024-09-29

## 2024-10-01 ENCOUNTER — OFFICE VISIT (OUTPATIENT)
Dept: OBGYN CLINIC | Facility: MEDICAL CENTER | Age: 34
End: 2024-10-01
Payer: COMMERCIAL

## 2024-10-01 VITALS
SYSTOLIC BLOOD PRESSURE: 140 MMHG | WEIGHT: 189 LBS | DIASTOLIC BLOOD PRESSURE: 87 MMHG | HEART RATE: 76 BPM | HEIGHT: 62 IN | BODY MASS INDEX: 34.78 KG/M2

## 2024-10-01 DIAGNOSIS — G56.01 CARPAL TUNNEL SYNDROME OF RIGHT WRIST: Primary | ICD-10-CM

## 2024-10-01 PROCEDURE — 99203 OFFICE O/P NEW LOW 30 MIN: CPT | Performed by: ORTHOPAEDIC SURGERY

## 2024-10-01 NOTE — PROGRESS NOTES
The HAND & UPPER EXTREMITY OFFICE VISIT   Referred By:  Mark Pool Md  [address] [place] [address]      Chief Complaint:     Right hand numbness and tingling     History of Present Illness:   34 y.o., female presents with numbness and tingling in the right hand which has been present for about 4 years. She reports her symptoms initially began in her 3rd trimester of pregnancy about 4 years ago. She reports her symptoms now occur daily. She does wear a wrist brace while sleeping which helps with her nighttime symptoms. She also has a soft brace which she wears during the day. 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    ADLs: Community ambulator  Smoke: denies ETOH: occasional   Drugs:  denies Job: employed in lab       Past Medical History:  Past Medical History:   Diagnosis Date    Migraine     Otitis media 8/11/19    Tinnitus      Past Surgical History:   Procedure Laterality Date    DILATION AND CURETTAGE OF UTERUS      EYE SURGERY  12/2015    Lasik    LASIK      WISDOM TOOTH EXTRACTION       Family History   Problem Relation Age of Onset    Heart disease Father     Heart disease Maternal Grandfather     Liver cancer Paternal Grandmother     Cancer Paternal Grandmother         Pancreatic    Other Paternal Grandfather         Angioid streaks     Stroke Paternal Grandfather     Heart disease Family     Migraines Mother     Thyroid disease Mother         Hypothyroidism    Arthritis Mother     Hearing loss Mother     Arthritis Maternal Grandmother     Hearing loss Maternal Grandmother      Social History     Socioeconomic History    Marital status: /Civil Union     Spouse name: Not on file    Number of children: Not on file    Years of education: Not on file    Highest education level: Not on file   Occupational History    Not on file   Tobacco Use     "Smoking status: Never    Smokeless tobacco: Never   Vaping Use    Vaping status: Never Used   Substance and Sexual Activity    Alcohol use: Yes    Drug use: Never    Sexual activity: Yes     Partners: Male     Birth control/protection: OCP, None     Comment: Uses birth control - As per [employer]    Other Topics Concern    Not on file   Social History Narrative    Takes dietary supplements     Social Determinants of Health     Financial Resource Strain: Not on file   Food Insecurity: Not on file   Transportation Needs: Not on file   Physical Activity: Not on file   Stress: Not on file   Social Connections: Not on file   Intimate Partner Violence: Not on file   Housing Stability: Not on file     Scheduled Meds:  Continuous Infusions:No current facility-administered medications for this visit.    PRN Meds:.  Allergies   Allergen Reactions    Latex Rash           Physical Examination:    /87   Pulse 76   Ht 5' 2\" (1.575 m)   Wt 85.7 kg (189 lb)   BMI 34.57 kg/m²     Gen: A&Ox3, NAD  Cardiac: regular rate  Chest: non labored breathing  Abdomen: Non-distended    Right Upper Extremity:  Skin CDI  No obvious deformity of the shoulder, arm, elbow, forearm, wrist, hand  Swelling Negative  Non-tender throughout the wrist or hand  Sensation intact to light touch in the axillary median, ulnar, and radial nerve distributions  5/5 thumb abduction, opposition, interosseous  2+RP  Negative Tinel's at the carpal tunnel  Positive Durkan's  Negative Tinel's at the cubital tunnel   Negative elbow flexion compression test     CTS-6: Right Hand    Symptoms and History   1. Numbness predominantly or exclusively in the median nerve territory _3.5_ (3.5)   Sensory symptoms are mostly in the thumb, index, middle and/or ring fingers     2. Nocturnal numbness __4__ (4)   Symptoms are predominantly the patient sleep; numbness wakes patient from sleep     Physical examination   3. Thenar atrophy and/or weakness __0___ (5)   The bulk " the thenar area is reduced or where manual motor testing shows strength of grade 4 less     4. Positive Phalen’s test __5__ (5)   Flexion of the wrist reproduces her worsened symptoms of numbness in the median nerve territory     5. Loss of 2 point discrimination _0__ (4.5)   Failure to discriminate 2 points held 5 mm or less apart from one another, in the median innervated digits     6. Positive Tinel sign __0__ (4)   Light tapping over the median nerve at the level of the carpal tunnel causing radiating paraesthesias     Total __12.5___ (26)     *>12 = 0.80 probably of carpal tunnel syndrome >5 = 0.25 probably of carpal tunnel syndrome       Left Upper Extremity:  Skin CDI  No obvious deformity of the shoulder, arm, elbow, forearm, wrist, hand  Swelling Negative  Sensation intact to light touch in the axillary median, ulnar, and radial nerve distributions  5/5 thumb abduction, opposition, interosseous  2+RP  Negative Tinel's at the carpal tunnel  Negative Durkan's  Negative Tinel's at the cubital tunnel   Negative elbow flexion compression test      Studies:  No imaging to review      Assessment and Plan:  1. Carpal tunnel syndrome of right wrist  Ambulatory Referral to Orthopedic Surgery          34 y.o. female presents with signs and symptoms consistent with the above diagnosis.  We discussed the natural history of this condition and its pathogenesis.  We discussed operative and nonoperative treatment options. On exam, she does demonstrate symptoms consistent with carpal tunnel syndrome and her CTS 6 score is greater than 12. We reviewed treatment options including nighttime bracing, CSI, or surgery. Risks and benefits of all treatment options were discussed with the patient. She has been wearing a wrist brace at night which does help with her nighttime symptoms. She reports that she is not interested in surgery at the moment as she currently has a toddler at home and is working on her thesis. Recommend  continued nighttime bracing for now to help control her symptoms. We discussed that she should continue to monitor her symptoms and if she notices any changes or progression of her symptoms, she should return to the office. We also discussed the possibility of obtaining US or EMG for further evaluation if needed. It is recommended she return to the office as needed if the problem fails to improve, worsens, or recurs.    she expressed understanding of the plan and agreed. We encouraged them to contact our office with any questions or concerns.         Jaime Castillo MD  Hand and Upper Extremity Surgery        *This note was dictated using Dragon voice recognition software. Please excuse any word substitutions or errors.*      Scribe Attestation      I,:  Alivia Black PA-C am acting as a scribe while in the presence of the attending physician.:       I,:  Jaime Castillo MD personally performed the services described in this documentation    as scribed in my presence.:

## 2024-10-14 ENCOUNTER — OCCMED (OUTPATIENT)
Dept: URGENT CARE | Facility: CLINIC | Age: 34
End: 2024-10-14

## 2024-10-14 ENCOUNTER — APPOINTMENT (OUTPATIENT)
Dept: LAB | Facility: CLINIC | Age: 34
End: 2024-10-14

## 2024-10-14 DIAGNOSIS — Z02.1 ENCOUNTER FOR PRE-EMPLOYMENT HEALTH SCREENING EXAMINATION: Primary | ICD-10-CM

## 2024-10-14 DIAGNOSIS — Z02.1 ENCOUNTER FOR PRE-EMPLOYMENT HEALTH SCREENING EXAMINATION: ICD-10-CM

## 2024-10-14 LAB
MEV IGG SER QL IA: NORMAL
MEV IGG SER QL IA: NORMAL
MUV IGG SER QL IA: NORMAL
MUV IGG SER QL IA: NORMAL
RUBV IGG SERPL IA-ACNC: 18.4 IU/ML
RUBV IGG SERPL IA-ACNC: 18.4 IU/ML
VZV IGG SER QL IA: NORMAL
VZV IGG SER QL IA: NORMAL

## 2024-10-14 PROCEDURE — 86762 RUBELLA ANTIBODY: CPT

## 2024-10-14 PROCEDURE — 36415 COLL VENOUS BLD VENIPUNCTURE: CPT

## 2024-10-14 PROCEDURE — 86787 VARICELLA-ZOSTER ANTIBODY: CPT

## 2024-10-14 PROCEDURE — 86765 RUBEOLA ANTIBODY: CPT

## 2024-10-14 PROCEDURE — 86735 MUMPS ANTIBODY: CPT

## 2024-10-14 PROCEDURE — 86480 TB TEST CELL IMMUN MEASURE: CPT

## 2024-10-15 LAB
GAMMA INTERFERON BACKGROUND BLD IA-ACNC: 0 IU/ML
GAMMA INTERFERON BACKGROUND BLD IA-ACNC: 0 IU/ML
M TB IFN-G BLD-IMP: NEGATIVE
M TB IFN-G BLD-IMP: NEGATIVE
M TB IFN-G CD4+ BCKGRND COR BLD-ACNC: 0 IU/ML
MITOGEN IGNF BCKGRD COR BLD-ACNC: 3.42 IU/ML
MITOGEN IGNF BCKGRD COR BLD-ACNC: 3.42 IU/ML

## 2025-03-26 ENCOUNTER — OFFICE VISIT (OUTPATIENT)
Age: 35
End: 2025-03-26
Payer: COMMERCIAL

## 2025-03-26 VITALS
TEMPERATURE: 98.6 F | HEIGHT: 62 IN | DIASTOLIC BLOOD PRESSURE: 84 MMHG | WEIGHT: 188 LBS | SYSTOLIC BLOOD PRESSURE: 122 MMHG | OXYGEN SATURATION: 99 % | HEART RATE: 83 BPM | BODY MASS INDEX: 34.6 KG/M2

## 2025-03-26 DIAGNOSIS — Z00.00 ROUTINE ADULT HEALTH MAINTENANCE: Primary | ICD-10-CM

## 2025-03-26 PROCEDURE — 99395 PREV VISIT EST AGE 18-39: CPT | Performed by: FAMILY MEDICINE

## 2025-03-28 NOTE — PROGRESS NOTES
"Name: Paty Ocasio      : 1990      MRN: [id number]  Encounter Provider: Teja Cho MD  Encounter Date: 3/26/2025   Encounter department: [facility]  :  Assessment & Plan  Routine adult health maintenance  Annual well visit. Discussed various safety and health maintenance issues including healthy diet like the Mediterranean diet, exercise, ample sleep, stress reduction, and healthy weight as tolerated. Discussed supportive care and return parameters.   Orders:    CBC and differential; Future    Comprehensive metabolic panel; Future    TSH, 3rd generation with Free T4 reflex; Future    Lipid Panel with Direct LDL reflex; Future    Hemoglobin A1C; Future           History of Present Illness   Patient is a 35 y/o woman who presents for annual well visit admits being active eats and sleeps well.    Hip Pain       Review of Systems   Constitutional: Negative.    HENT: Negative.     Eyes: Negative.    Respiratory: Negative.     Cardiovascular: Negative.    Gastrointestinal: Negative.    Endocrine: Negative.    Genitourinary: Negative.    Musculoskeletal: Negative.    Allergic/Immunologic: Negative.    Neurological: Negative.    Hematological: Negative.    Psychiatric/Behavioral: Negative.     All other systems reviewed and are negative.      Objective   /84 (BP Location: Left arm, Patient Position: Sitting, Cuff Size: Standard)   Pulse 83   Temp 98.6 °F (37 °C) (Temporal)   Ht 5' 2\" (1.575 m)   Wt 85.3 kg (188 lb)   LMP 2025   SpO2 99%   BMI 34.39 kg/m²      Physical Exam  Constitutional:       General: She is not in acute distress.     Appearance: She is well-developed. She is not diaphoretic.   HENT:      Head: Normocephalic and atraumatic.      Right Ear: External ear normal.      Left Ear: External ear normal.      Nose: Nose normal.      Mouth/Throat:      Pharynx: No oropharyngeal exudate.   Eyes:      General:         Right eye: No discharge.    "      Left eye: No discharge.      Conjunctiva/sclera: Conjunctivae normal.      Pupils: Pupils are equal, round, and reactive to light.   Neck:      Thyroid: No thyromegaly.      Trachea: No tracheal deviation.   Cardiovascular:      Rate and Rhythm: Normal rate and regular rhythm.      Heart sounds: Normal heart sounds. No murmur heard.     No friction rub. No gallop.   Pulmonary:      Effort: Pulmonary effort is normal. No respiratory distress.      Breath sounds: Normal breath sounds.   Abdominal:      General: There is no distension.      Palpations: Abdomen is soft.      Tenderness: There is no abdominal tenderness. There is no guarding or rebound.   Musculoskeletal:         General: Normal range of motion.   Lymphadenopathy:      Cervical: No cervical adenopathy.   Skin:     General: Skin is warm.   Neurological:      Mental Status: She is alert and oriented to person, place, and time.      Cranial Nerves: No cranial nerve deficit.   Psychiatric:         Behavior: Behavior normal.         Thought Content: Thought content normal.         Judgment: Judgment normal.         Answers submitted by the patient for this visit:  Annual Physical (Submitted on 3/25/2025)  Diet/Nutrition choices: well balanced diet, limited junk food, intermittent fasting, adequate fiber intake  Exercise choices: no formal exercise, walking, more than 2 hours on average  Sleep choices: 4-6 hours of sleep on average  Hearing choices: normal hearing bilateral ears  Vision choices: no vision problems, most recent eye exam < 1 year ago, previous LASIK surgery  Dental choices: regular dental visits, brushes teeth twice daily, brushes teeth three times daily, floss regularly  Do you currently have an OB/GYN provider that you routinely follow with?: Yes  Menopausal status: premenopausal  Last menstrual cycle (if applicable):: 3/2/2025  Any history of sexual transmitted disease/infection?: No  Contraception: barrier methods  Do you have an  advance directive/living will?: No  Do you have a durable power of  (POA)?: No

## 2025-04-16 ENCOUNTER — TELEPHONE (OUTPATIENT)
Age: 35
End: 2025-04-16

## 2025-04-16 DIAGNOSIS — M25.552 LEFT HIP PAIN: Primary | ICD-10-CM

## 2025-04-16 NOTE — TELEPHONE ENCOUNTER
Please review patient request. Review order and sign. Confirmed with patient left hip.    Thank you

## 2025-04-16 NOTE — TELEPHONE ENCOUNTER
Pt last seen on 3/26, states she continues to have hip pain with no relief from Advil. She stated PT was discussed during last visit as next step and would like referral placed. Please advised, thank you.

## 2025-04-25 PROBLEM — Z00.00 ROUTINE ADULT HEALTH MAINTENANCE: Status: RESOLVED | Noted: 2023-12-05 | Resolved: 2025-04-25

## 2025-04-29 ENCOUNTER — EVALUATION (OUTPATIENT)
Dept: PHYSICAL THERAPY | Facility: MEDICAL CENTER | Age: 35
End: 2025-04-29
Attending: FAMILY MEDICINE
Payer: COMMERCIAL

## 2025-04-29 DIAGNOSIS — M25.552 LEFT HIP PAIN: Primary | ICD-10-CM

## 2025-04-29 PROCEDURE — 97161 PT EVAL LOW COMPLEX 20 MIN: CPT

## 2025-04-29 PROCEDURE — 97112 NEUROMUSCULAR REEDUCATION: CPT

## 2025-04-29 NOTE — PROGRESS NOTES
PT Evaluation     Today's date: 2025  Patient name: Paty Ocasio  : 1990  MRN: [id number]  Referring provider: Teja Cho MD  Dx:   Encounter Diagnosis     ICD-10-CM    1. Left hip pain  M25.552 Ambulatory Referral to Physical Therapy                     Assessment  Impairments: abnormal muscle firing, abnormal muscle tone, abnormal or restricted ROM, abnormal movement, activity intolerance, impaired physical strength, lacks appropriate home exercise program and pain with function    Assessment details: Paty Ocasio  is a pleasant 34 y.o. female who presents with left hip pain.  The primary movement problem is s/s consistent with gluteal tendinopathy resulting in limited Rom, strength deficit, and pelvic stabilizer weakness, which limit her ability to perform ADLs, IADLs and recreational activity.  No referral is necessary at this time based on examination results.   The patient's greatest concerns is not being able to stay active.     Problem List:  1) gluteal tendinopathy   2) pelvic stabilizer weakness  3) strength deficit    Etiologic factors include poor glute activation.  Pt. will benefit from skilled PT services that includes manual therapy techniques to enhance tissue extensibility, neuromuscular re-education to facilitate motor control, therapeutic exercise to increase functional mobility, and modalities prn to reduce pain and inflammation.  Understanding of Dx/Px/POC: good     Prognosis: good  Prognosis details: Positive prognostic indicators: motivation to improve   Negative prognostic indicators: chronicity of symptoms    Goals  Impairment Goals  - Pt I with initial HEP in 1-2 visits  - Improve ROM equal to contralateral side in 4-6 weeks  - Increase strength to 5/5 in all affected areas in 4-6 weeks    Functional Goals  - Increase Functional Status Measure to: 76 in 6-8 weeks  - Patient will be independent with comprehensive HEP in 6-8 weeks  - Ambulation is improved  to prior level of function in 6-8 weeks  - Stair climbing is improved to prior level of function in 6-8 weeks  - Squatting is improved to prior level of function in 6-8 weeks      Plan  Patient would benefit from: skilled physical therapy  Planned modality interventions: low level laser therapy, TENS and cryotherapy    Planned therapy interventions: manual therapy, massage, neuromuscular re-education, patient education, postural training, strengthening, stretching, therapeutic activities, therapeutic exercise, flexibility, functional ROM exercises, graded exercise, home exercise program, kinesiology taping, joint mobilization, IASTM and Lara taping    Treatment plan discussed with: patient  Plan details: Prognosis above is given PT services 2x/week tapering to 1x/week over the next 2 months and home program adherence.      Subjective Evaluation    History of Present Illness  Mechanism of injury: Paty Ocasio presents with c/c of left hip pain. Symptoms began at least two months ago with mechanism of injury: insidious onset.   Aggravating factors: sitting up against the couch and getting up, laying on the side  Relieving factors: moving around  24hr pain pattern: 0/10 (current), 0/10 (best), 5/10 (worst), location: lateral left hip, descriptors: sharp,   Imaging: none  Previous treatments: Advil  Occupation/recreation: [employer] employee, walking,   Primary concern: not being able to stay active  Patient goals: get pain and discomfort to go away        Objective     Palpation   Left   Hypertonic in the gluteus medius and TFL.   Tenderness of the gluteus medius and TFL.     Tenderness     Left Hip   Tenderness in the greater trochanter.     Active Range of Motion   Left Hip   Normal active range of motion    Right Hip   Normal active range of motion    Passive Range of Motion   Left Hip   Normal passive range of motion    Right Hip   Normal passive range of motion    Joint Play   Left Hip   Joints within  functional limits are the posterior hip capsule, anterior hip capsule, lateral hip capsule and long axis distraction.     Right Hip   Joints within functional limits are the posterior hip capsule, anterior hip capsule, long axis distraction and long axis distraction.     Strength/Myotome Testing     Left Hip   Planes of Motion   Flexion: 4+  Abduction: 4+  Adduction: 4+    Isolated Muscles   Gluteus moe: 4  Gluteus medius: 4-    Right Hip   Normal muscle strength    Isolated Muscles   Gluteus maximums: 4+  Gluteus medius: 4             Precautions: none    HEP: Access Code: [email or web address]  URL: [email or web address]  Date: 04/29/2025  Prepared by: Lyric Peck    Exercises  - Supine Bridge with Resistance Band  - 1 x daily - 7 x weekly - 2 sets - 10 reps  - Supine Bridge with Mini Swiss Ball Between Knees  - 1 x daily - 7 x weekly - 2 sets - 10 reps  - Clamshell with Resistance  - 1 x daily - 7 x weekly - 2 sets - 10 reps  - Sidelying Reverse Clamshell with Resistance  - 1 x daily - 7 x weekly - 2 sets - 10 reps  Manuals 4/29                                                                Neuro Re-Ed             Side steps             Monster walks             Lateral step up                                                    Hep review and pt education 25'            Ther Ex             bike             bridges             clamshells             Reverse clamshells             SL hip abd             Heel push             Seated IR             Hip abd/ext                                                    Ther Activity                                       Gait Training                                       Modalities

## 2025-05-07 ENCOUNTER — APPOINTMENT (OUTPATIENT)
Dept: PHYSICAL THERAPY | Facility: MEDICAL CENTER | Age: 35
End: 2025-05-07
Attending: FAMILY MEDICINE
Payer: COMMERCIAL

## 2025-05-08 ENCOUNTER — APPOINTMENT (OUTPATIENT)
Dept: LAB | Facility: CLINIC | Age: 35
End: 2025-05-08
Attending: PREVENTIVE MEDICINE
Payer: COMMERCIAL

## 2025-05-08 ENCOUNTER — APPOINTMENT (OUTPATIENT)
Dept: LAB | Facility: CLINIC | Age: 35
End: 2025-05-08
Attending: FAMILY MEDICINE
Payer: COMMERCIAL

## 2025-05-08 DIAGNOSIS — Z00.00 ROUTINE GENERAL MEDICAL EXAMINATION AT A HEALTH CARE FACILITY: Primary | ICD-10-CM

## 2025-05-08 DIAGNOSIS — Z00.8 HEALTH EXAMINATION IN POPULATION SURVEY: ICD-10-CM

## 2025-05-08 LAB
ALBUMIN SERPL BCG-MCNC: 4.2 G/DL (ref 3.5–5)
ALP SERPL-CCNC: 58 U/L (ref 34–104)
ALT SERPL W P-5'-P-CCNC: 20 U/L (ref 7–52)
ANION GAP SERPL CALCULATED.3IONS-SCNC: 10 MMOL/L (ref 4–13)
AST SERPL W P-5'-P-CCNC: 21 U/L (ref 13–39)
BASOPHILS # BLD AUTO: 0.05 THOUSANDS/ÂΜL (ref 0–0.1)
BASOPHILS NFR BLD AUTO: 1 % (ref 0–1)
BILIRUB SERPL-MCNC: 0.62 MG/DL (ref 0.2–1)
BUN SERPL-MCNC: 12 MG/DL (ref 5–25)
CALCIUM SERPL-MCNC: 9.4 MG/DL (ref 8.4–10.2)
CHLORIDE SERPL-SCNC: 104 MMOL/L (ref 96–108)
CHOLEST SERPL-MCNC: 137 MG/DL (ref ?–200)
CO2 SERPL-SCNC: 26 MMOL/L (ref 21–32)
CREAT SERPL-MCNC: 0.85 MG/DL (ref 0.6–1.3)
EOSINOPHIL # BLD AUTO: 0.49 THOUSAND/ÂΜL (ref 0–0.61)
EOSINOPHIL NFR BLD AUTO: 6 % (ref 0–6)
ERYTHROCYTE [DISTWIDTH] IN BLOOD BY AUTOMATED COUNT: 12.1 % (ref 11.6–15.1)
EST. AVERAGE GLUCOSE BLD GHB EST-MCNC: 100 MG/DL
GFR SERPL CREATININE-BSD FRML MDRD: 88 ML/MIN/1.73SQ M
GLUCOSE P FAST SERPL-MCNC: 95 MG/DL (ref 65–99)
HBA1C MFR BLD: 5.1 %
HCT VFR BLD AUTO: 40.2 % (ref 34.8–46.1)
HDLC SERPL-MCNC: 51 MG/DL
HGB BLD-MCNC: 13.9 G/DL (ref 11.5–15.4)
IMM GRANULOCYTES # BLD AUTO: 0.03 THOUSAND/UL (ref 0–0.2)
IMM GRANULOCYTES NFR BLD AUTO: 0 % (ref 0–2)
LDLC SERPL CALC-MCNC: 72 MG/DL (ref 0–100)
LYMPHOCYTES # BLD AUTO: 2.26 THOUSANDS/ÂΜL (ref 0.6–4.47)
LYMPHOCYTES NFR BLD AUTO: 26 % (ref 14–44)
MCH RBC QN AUTO: 31.2 PG (ref 26.8–34.3)
MCHC RBC AUTO-ENTMCNC: 34.6 G/DL (ref 31.4–37.4)
MCV RBC AUTO: 90 FL (ref 82–98)
MONOCYTES # BLD AUTO: 0.53 THOUSAND/ÂΜL (ref 0.17–1.22)
MONOCYTES NFR BLD AUTO: 6 % (ref 4–12)
NEUTROPHILS # BLD AUTO: 5.51 THOUSANDS/ÂΜL (ref 1.85–7.62)
NEUTS SEG NFR BLD AUTO: 61 % (ref 43–75)
NRBC BLD AUTO-RTO: 0 /100 WBCS
PLATELET # BLD AUTO: 238 THOUSANDS/UL (ref 149–390)
PMV BLD AUTO: 10 FL (ref 8.9–12.7)
POTASSIUM SERPL-SCNC: 4.3 MMOL/L (ref 3.5–5.3)
PROT SERPL-MCNC: 7 G/DL (ref 6.4–8.4)
RBC # BLD AUTO: 4.45 MILLION/UL (ref 3.81–5.12)
SODIUM SERPL-SCNC: 140 MMOL/L (ref 135–147)
TRIGL SERPL-MCNC: 71 MG/DL (ref ?–150)
TSH SERPL DL<=0.05 MIU/L-ACNC: 2.26 UIU/ML (ref 0.45–4.5)
WBC # BLD AUTO: 8.87 THOUSAND/UL (ref 4.31–10.16)

## 2025-05-08 PROCEDURE — 80053 COMPREHEN METABOLIC PANEL: CPT

## 2025-05-08 PROCEDURE — 36415 COLL VENOUS BLD VENIPUNCTURE: CPT

## 2025-05-08 PROCEDURE — 83036 HEMOGLOBIN GLYCOSYLATED A1C: CPT

## 2025-05-08 PROCEDURE — 84443 ASSAY THYROID STIM HORMONE: CPT

## 2025-05-08 PROCEDURE — 80061 LIPID PANEL: CPT

## 2025-05-08 PROCEDURE — 85025 COMPLETE CBC W/AUTO DIFF WBC: CPT

## 2025-05-12 ENCOUNTER — OFFICE VISIT (OUTPATIENT)
Dept: PHYSICAL THERAPY | Facility: MEDICAL CENTER | Age: 35
End: 2025-05-12
Attending: FAMILY MEDICINE
Payer: COMMERCIAL

## 2025-05-12 DIAGNOSIS — M25.552 LEFT HIP PAIN: Primary | ICD-10-CM

## 2025-05-12 PROCEDURE — 97110 THERAPEUTIC EXERCISES: CPT

## 2025-05-12 NOTE — PROGRESS NOTES
Daily Note     Today's date: 2025  Patient name: Paty Ocasio  : 1990  MRN: [id number]  Referring provider: Teja Cho MD  Dx:   Encounter Diagnosis     ICD-10-CM    1. Left hip pain  M25.552                      Subjective: Pt reports that she feels a little better.       Objective: See treatment diary below      Assessment: POC initiated. No adverse effects noted. Tolerated treatment well. Patient demonstrated fatigue post treatment, exhibited good technique with therapeutic exercises, and would benefit from continued PT      Plan: Continue per plan of care.      Precautions: none    HEP: Access Code: [email or web address]  URL: [email or web address]  Date: 2025  Prepared by: Lyric Peck    Exercises  - Supine Bridge with Resistance Band  - 1 x daily - 7 x weekly - 2 sets - 10 reps  - Supine Bridge with Mini Swiss Ball Between Knees  - 1 x daily - 7 x weekly - 2 sets - 10 reps  - Clamshell with Resistance  - 1 x daily - 7 x weekly - 2 sets - 10 reps  - Sidelying Reverse Clamshell with Resistance  - 1 x daily - 7 x weekly - 2 sets - 10 reps  Manuals                                                                 Neuro Re-Ed             Side steps             Monster walks             Lateral step up                                                    Hep review and pt education             Ther Ex             bike 5'            bridges 2x10 hip abd  2x10 hip add            clamshells 2x10 otb            Reverse clamshells 2x10 ytb            SL hip abd             Heel push X20 5s            Seated IR X20 ytb            Hip abd/ext 2x10            LP 2x10 high/low  50# SL                                      Ther Activity                                       Gait Training                                       Modalities

## 2025-05-14 ENCOUNTER — APPOINTMENT (OUTPATIENT)
Dept: PHYSICAL THERAPY | Facility: MEDICAL CENTER | Age: 35
End: 2025-05-14
Attending: FAMILY MEDICINE
Payer: COMMERCIAL

## 2025-05-15 ENCOUNTER — RESULTS FOLLOW-UP (OUTPATIENT)
Age: 35
End: 2025-05-15

## 2025-05-21 ENCOUNTER — APPOINTMENT (OUTPATIENT)
Dept: PHYSICAL THERAPY | Facility: MEDICAL CENTER | Age: 35
End: 2025-05-21
Attending: FAMILY MEDICINE
Payer: COMMERCIAL

## 2025-05-28 ENCOUNTER — OFFICE VISIT (OUTPATIENT)
Dept: PHYSICAL THERAPY | Facility: MEDICAL CENTER | Age: 35
End: 2025-05-28
Attending: FAMILY MEDICINE
Payer: COMMERCIAL

## 2025-05-28 DIAGNOSIS — M25.552 LEFT HIP PAIN: Primary | ICD-10-CM

## 2025-05-28 PROCEDURE — 97110 THERAPEUTIC EXERCISES: CPT

## 2025-05-28 NOTE — PROGRESS NOTES
Daily Note     Today's date: 2025  Patient name: Paty Ocasio  : 1990  MRN: [id number]  Referring provider: Teja Cho MD  Dx:   Encounter Diagnosis     ICD-10-CM    1. Left hip pain  M25.552                      Subjective: Pt reports that she has been compliant with her HEP. Notes that she is still having pain in her L hip which she thinks has not changed since she has been sleeping on the couch everyday with her dog instead of her bed.       Objective: See treatment diary below      Assessment: Began on the bike as an active warmup. She was able to perform all exercises again this session with no adverse reactions. Added in side steps and monster walks today to promote glute strengthening.  Tolerated treatment well. Patient demonstrated fatigue post treatment, exhibited good technique with therapeutic exercises, and would benefit from continued PT      Plan: Continue per plan of care.      Precautions: none    HEP: Access Code: [email or web address]  URL: [email or web address]  Date: 2025  Prepared by: Lyric Peck    Exercises  - Supine Bridge with Resistance Band  - 1 x daily - 7 x weekly - 2 sets - 10 reps  - Supine Bridge with Mini Swiss Ball Between Knees  - 1 x daily - 7 x weekly - 2 sets - 10 reps  - Clamshell with Resistance  - 1 x daily - 7 x weekly - 2 sets - 10 reps  - Sidelying Reverse Clamshell with Resistance  - 1 x daily - 7 x weekly - 2 sets - 10 reps  Manuals                                                                 Neuro Re-Ed             Side steps RTB 3 laps            Monster walks RTB 3 laps            Lateral step up                                                    Hep review and pt education             Ther Ex             bike 5'            bridges 2x10 hip abd  2x10 hip add            clamshells 2x10 rtb            Reverse clamshells 2x10 rtb            SL hip abd             Heel push X20 5s            Seated IR X20 ytb             Hip abd/ext 2x10            LP 2x10 high/low  55# SL                                      Ther Activity                                       Gait Training                                       Modalities

## 2025-06-04 ENCOUNTER — OFFICE VISIT (OUTPATIENT)
Dept: PHYSICAL THERAPY | Facility: MEDICAL CENTER | Age: 35
End: 2025-06-04
Attending: FAMILY MEDICINE
Payer: COMMERCIAL

## 2025-06-04 DIAGNOSIS — M25.552 LEFT HIP PAIN: Primary | ICD-10-CM

## 2025-06-04 PROCEDURE — 97110 THERAPEUTIC EXERCISES: CPT

## 2025-06-04 NOTE — PROGRESS NOTES
"Daily Note     Today's date: 2025  Patient name: Paty Ocasio  : 1990  MRN: [id number]  Referring provider: Teja Cho MD  Dx:   Encounter Diagnosis     ICD-10-CM    1. Left hip pain  M25.552                      NETO Johnson participated in the care of Paty Ocasio. I fully participated in and made all final clinical decisions. I have reviewed and agree with the documentation of Paty Ocasio's visit.      Subjective: Pt reports that she is slowly getting better.        Objective: See treatment diary below      Assessment: Tolerated treatment well. Patient demonstrated fatigue post treatment, exhibited good technique with therapeutic exercises, and would benefit from continued PT      Plan: Continue per plan of care.      Precautions: none    HEP: Access Code: [email or web address]  URL: [email or web address]  Date: 2025  Prepared by: Lyric Peck    Exercises  - Supine Bridge with Resistance Band  - 1 x daily - 7 x weekly - 2 sets - 10 reps  - Supine Bridge with Mini Swiss Ball Between Knees  - 1 x daily - 7 x weekly - 2 sets - 10 reps  - Clamshell with Resistance  - 1 x daily - 7 x weekly - 2 sets - 10 reps  - Sidelying Reverse Clamshell with Resistance  - 1 x daily - 7 x weekly - 2 sets - 10 reps  Manuals /                                                                Neuro Re-Ed             Side steps YTB 3 laps            Monster walks YTB 3 laps            Lateral step up 6\" 2x10                                                   Hep review and pt education             Ther Ex             bike 5'            bridges 2x10 hip abd otb  2x10 hip add            clamshells 2x10 ytb            Reverse clamshells 2x10 ytb            SL hip abd             Heel push X20 5s            Seated IR X20 ytb            Hip abd/ext 2x10 ytb            LP 2x10 high/low  55# SL                                      Ther Activity                                     "   Gait Training                                       Modalities

## 2025-06-09 ENCOUNTER — OFFICE VISIT (OUTPATIENT)
Dept: PHYSICAL THERAPY | Facility: MEDICAL CENTER | Age: 35
End: 2025-06-09
Attending: FAMILY MEDICINE
Payer: COMMERCIAL

## 2025-06-09 DIAGNOSIS — M25.552 LEFT HIP PAIN: Primary | ICD-10-CM

## 2025-06-09 PROCEDURE — 97110 THERAPEUTIC EXERCISES: CPT

## 2025-06-09 NOTE — PROGRESS NOTES
"Daily Note     Today's date: 2025  Patient name: Paty Ocasio  : 1990  MRN: [id number]  Referring provider: Teja Cho MD  Dx:   Encounter Diagnosis     ICD-10-CM    1. Left hip pain  M25.552                          Subjective: Pt reports that she is doing pretty good overall today with no new complaints.      Objective: See treatment diary below      Assessment: Tolerated treatment well. Progressed her bands this session with good tolerance overall. She was appropriately challenged. Patient demonstrated fatigue post treatment, exhibited good technique with therapeutic exercises, and would benefit from continued PT      Plan: Continue per plan of care.      Precautions: none    HEP: Access Code: [email or web address]  URL: [email or web address]  Date: 2025  Prepared by: Lyric Peck    Exercises  - Supine Bridge with Resistance Band  - 1 x daily - 7 x weekly - 2 sets - 10 reps  - Supine Bridge with Mini Swiss Ball Between Knees  - 1 x daily - 7 x weekly - 2 sets - 10 reps  - Clamshell with Resistance  - 1 x daily - 7 x weekly - 2 sets - 10 reps  - Sidelying Reverse Clamshell with Resistance  - 1 x daily - 7 x weekly - 2 sets - 10 reps  Manuals 6/4                                                                Neuro Re-Ed             Side steps RTB 3 laps            Monster walks RTB 3 laps            Lateral step up 6\" 2x10                                                   Hep review and pt education             Ther Ex             bike 5'            bridges 2x10 hip abd gtb  2x10 hip add            clamshells 2x10 gtb            Reverse clamshells 2x10 rtb            SL hip abd             Heel push X20 5s            Seated IR X20 rtb            Hip abd/ext 2x10 rtb            LP 2x10 high/low  55# SL                                      Ther Activity                                       Gait Training                                       Modalities                       "

## 2025-06-11 ENCOUNTER — APPOINTMENT (OUTPATIENT)
Dept: PHYSICAL THERAPY | Facility: MEDICAL CENTER | Age: 35
End: 2025-06-11
Attending: FAMILY MEDICINE
Payer: COMMERCIAL

## 2025-06-16 ENCOUNTER — OFFICE VISIT (OUTPATIENT)
Dept: PHYSICAL THERAPY | Facility: MEDICAL CENTER | Age: 35
End: 2025-06-16
Attending: FAMILY MEDICINE
Payer: COMMERCIAL

## 2025-06-16 DIAGNOSIS — M25.552 LEFT HIP PAIN: Primary | ICD-10-CM

## 2025-06-16 PROCEDURE — 97110 THERAPEUTIC EXERCISES: CPT

## 2025-06-16 NOTE — PROGRESS NOTES
"Daily Note     Today's date: 2025  Patient name: Paty Ocasio  : 1990  MRN: [id number]  Referring provider: Teja Cho MD  Dx:   Encounter Diagnosis     ICD-10-CM    1. Left hip pain  M25.552             Start Time: 1700  Stop Time: 1740  Total time in clinic (min): 40 minutes      Subjective: Pt reports that she is slowly getting better.  The pain has decreased in frequency and intensity since her last visit.       Objective: See treatment diary below      Assessment: Tolerated treatment well. Progressed treatment as tolerated. Patient demonstrated fatigue post treatment, exhibited good technique with therapeutic exercises, and would benefit from continued PT      Plan: Continue per plan of care.      Precautions: none    HEP: Access Code: [email or web address]  URL: [email or web address]  Date: 2025  Prepared by: yLric Peck    Exercises  - Supine Bridge with Resistance Band  - 1 x daily - 7 x weekly - 2 sets - 10 reps  - Supine Bridge with Mini Swiss Ball Between Knees  - 1 x daily - 7 x weekly - 2 sets - 10 reps  - Clamshell with Resistance  - 1 x daily - 7 x weekly - 2 sets - 10 reps  - Sidelying Reverse Clamshell with Resistance  - 1 x daily - 7 x weekly - 2 sets - 10 reps  Manuals                                                                 Neuro Re-Ed             Side steps RTB 3 laps            Monster walks RTB 3 laps            Lateral step up 6\" 2x10                                                   Hep review and pt education             Ther Ex             bike 5'            bridges 2x10 hip abd gtb  2x10 hip add            clamshells 2x10 gtb            Reverse clamshells 2x10 rtb            SL hip abd             Heel push X20 5s            Seated IR X20 rtb            Hip abd/ext 2x10 rtb            LP 2x10 high/low  60# SL                                      Ther Activity                                       Gait Training                        "                Modalities

## 2025-06-18 ENCOUNTER — APPOINTMENT (OUTPATIENT)
Dept: PHYSICAL THERAPY | Facility: MEDICAL CENTER | Age: 35
End: 2025-06-18
Attending: FAMILY MEDICINE
Payer: COMMERCIAL

## 2025-06-24 ENCOUNTER — TELEPHONE (OUTPATIENT)
Age: 35
End: 2025-06-24

## 2025-06-24 DIAGNOSIS — E66.9 OBESITY (BMI 30-39.9): Primary | ICD-10-CM

## 2025-06-24 NOTE — TELEPHONE ENCOUNTER
Last visit 03/26/2025  Next appointment 03/27/2026    Patient stated that when she was at physical therapy there was also a PT student there working and that student had accidentally put the pin on the weight for 110 lbs instead of 55 lbs for the leg presses. Due to this the patient knees are hurting and wants to make sure that she does not do exercises that target the knee until the pain subsides. Patient didn't know if doctor should place this in her chart or if she should just let the physical therapist know. Also patient stated during her last visit there was a conversation that took place about going on Wegovy or Zepbpound to help her lose weight since the dieting is not working. Patient said she would like to go on the medication and asked if she needs to make another appointment  or can a prescription be sent to [facility] in [place]. Please contact patient at [phone]. Please advise.

## 2025-06-25 ENCOUNTER — OFFICE VISIT (OUTPATIENT)
Dept: PHYSICAL THERAPY | Facility: MEDICAL CENTER | Age: 35
End: 2025-06-25
Attending: FAMILY MEDICINE
Payer: COMMERCIAL

## 2025-06-25 DIAGNOSIS — M25.552 LEFT HIP PAIN: Primary | ICD-10-CM

## 2025-06-25 PROCEDURE — 97110 THERAPEUTIC EXERCISES: CPT

## 2025-06-25 RX ORDER — SEMAGLUTIDE 0.25 MG/.5ML
INJECTION, SOLUTION SUBCUTANEOUS
Qty: 2 ML | Refills: 0 | Status: SHIPPED | OUTPATIENT
Start: 2025-06-25

## 2025-06-25 NOTE — PROGRESS NOTES
Daily Note     Today's date: 2025  Patient name: Paty Ocasio  : 1990  MRN: [id number]  Referring provider: Teja Cho MD  Dx:   Encounter Diagnosis     ICD-10-CM    1. Left hip pain  M25.552                          Subjective: Pt reports that Friday after PT she had a lot of pain in her left knee with steps. She began wearing her knee brace. She reports that she took a break from PT on Saturday and  and resumed Monday. She reports that her hip is feeling better.       Objective: See treatment diary below      Assessment: d/c leg press secondary to adverse effects after last session. Tolerated treatment well. Progressed treatment as tolerated. Patient demonstrated fatigue post treatment, exhibited good technique with therapeutic exercises, and would benefit from continued PT      Plan: Continue per plan of care.  Follow up in 2 weeks with updated HEP      Precautions: none    HEP: Access Code: [email or web address]  URL: [email or web address]  Date: 2025  Prepared by: Lyric Peck    Exercises  - Supine Bridge with Resistance Band  - 1 x daily - 7 x weekly - 2 sets - 10 reps  - Supine Bridge with Mini Swiss Ball Between Knees  - 1 x daily - 7 x weekly - 2 sets - 10 reps  - Clamshell with Resistance  - 1 x daily - 7 x weekly - 2 sets - 10 reps  - Sidelying Reverse Clamshell with Resistance  - 1 x daily - 7 x weekly - 2 sets - 10 reps  Manuals                                                                 Neuro Re-Ed             Side steps RTB 3 laps            Monster walks RTB 3 laps            Lateral step up NP                                                   Hep review and pt education             Ther Ex             bike 5'            bridges 2x10 hip abd gtb  2x10 hip add            clamshells 2x10 gtb            Reverse clamshells 2x10 rtb            SL hip abd             Heel push X20 5s            Seated IR X20 rtb            Hip abd/ext 2x10 rtb             LP D/c            4 way sLR X10 ea                         Ther Activity                                       Gait Training                                       Modalities

## 2025-06-25 NOTE — TELEPHONE ENCOUNTER
Spoke with patient and advised to discuss concern with PT and also advised her that a script was sent in for Wegovy. Patient verbalized understanding and had no further concerns at the time of call.

## 2025-06-25 NOTE — HOME EXERCISE EDUCATION
Program_ID:[id number]   Access Code: [email or web address]  URL: [email or web address]  Date: 06-  Prepared By: Lyric Peck    Program Notes      Exercises      - Supine Bridge with Resistance Band - 1 x daily - 7 x weekly - 2 sets - 10 reps      - Supine Bridge with Mini Swiss Ball Between Knees - 1 x daily - 7 x weekly - 2 sets - 10 reps      - Clamshell with Resistance - 1 x daily - 7 x weekly - 2 sets - 10 reps      - Sidelying Reverse Clamshell with Resistance - 1 x daily - 7 x weekly - 2 sets - 10 reps      - Supine Active Straight Leg Raise - 1 x daily - 3-4 x weekly - 2-3 sets - 10 reps      - Sidelying Hip Abduction - 1 x daily - 3-4 x weekly - 2-3 sets - 10 reps      - Prone Hip Extension - 1 x daily - 3-4 x weekly - 2-3 sets - 10 reps      - Sidelying Hip Adduction - 1 x daily - 3-4 x weekly - 2-3 sets - 10 reps

## 2025-06-26 ENCOUNTER — TELEPHONE (OUTPATIENT)
Age: 35
End: 2025-06-26

## 2025-06-26 NOTE — TELEPHONE ENCOUNTER
PA for (Wegovy) 0.25 MG/0.5ML SUBMITTED to     via    []CMM-KEY:   [x]Surescripts-Case ID # [id number]   []Availity-Auth ID # NDC #   []Faxed to plan   []Other website   []Phone call Case ID #     [x]PA sent as URGENT    All office notes, labs and other pertaining documents and studies sent. Clinical questions answered. Awaiting determination from insurance company.     Turnaround time for your insurance to make a decision on your Prior Authorization can take 7-21 business days.

## 2025-06-30 NOTE — TELEPHONE ENCOUNTER
Catheter removed intact.   PA for wegovy 0.25mg  APPROVED     Date(s) approved 6/26/25-6/26/26    Case #[id number]    Patient advised by          []MyChart Message  []Phone call   [x]LMOM  []L/M to call office as no active Communication consent on file  []Unable to leave detailed message as VM not approved on Communication consent       Pharmacy advised by    [x]Fax  []Phone call  []Secure Chat    Specialty Pharmacy    []     Approval letter scanned into Media No letter provided

## 2025-07-09 ENCOUNTER — OFFICE VISIT (OUTPATIENT)
Dept: PHYSICAL THERAPY | Facility: MEDICAL CENTER | Age: 35
End: 2025-07-09
Attending: FAMILY MEDICINE
Payer: COMMERCIAL

## 2025-07-09 DIAGNOSIS — M25.552 LEFT HIP PAIN: Primary | ICD-10-CM

## 2025-07-09 PROCEDURE — 97110 THERAPEUTIC EXERCISES: CPT

## 2025-07-09 NOTE — HOME EXERCISE EDUCATION
Program_ID:[id number]   Access Code: [email or web address]  URL: [email or web address]  Date: 07-  Prepared By: Lyric Peck    Program Notes      Exercises      - Supine Bridge with Resistance Band - 1 x daily - 7 x weekly - 2 sets - 10 reps      - Supine Bridge with Mini Swiss Ball Between Knees - 1 x daily - 7 x weekly - 2 sets - 10 reps      - Clamshell with Resistance - 1 x daily - 7 x weekly - 2 sets - 10 reps      - Sidelying Reverse Clamshell with Resistance - 1 x daily - 7 x weekly - 2 sets - 10 reps      - Supine Active Straight Leg Raise - 1 x daily - 3-4 x weekly - 2-3 sets - 10 reps      - Sidelying Hip Abduction - 1 x daily - 3-4 x weekly - 2-3 sets - 10 reps      - Prone Hip Extension - 1 x daily - 3-4 x weekly - 2-3 sets - 10 reps      - Sidelying Hip Adduction - 1 x daily - 3-4 x weekly - 2-3 sets - 10 reps      - Hip Abduction with Resistance Loop - 1 x daily - 7 x weekly - 2 sets - 10 reps      - Hip Extension with Resistance Loop - 1 x daily - 7 x weekly - 2 sets - 10 reps      - Side Stepping with Resistance at Ankles - 1 x daily - 7 x weekly -  sets -  reps - 3 laps hold

## 2025-07-09 NOTE — PROGRESS NOTES
Daily Note     Today's date: 2025  Patient name: Paty Ocasio  : 1990  MRN: [id number]  Referring provider: Teja Cho MD  Dx:   Encounter Diagnosis     ICD-10-CM    1. Left hip pain  M25.552                          Subjective: Pt reports that her knee was feeling better until yesterday. She notes that her pain with change in positions has improved.       Objective: See treatment diary below      Assessment: Progressed resistance with exercises. Patient demonstrated fatigue post treatment, exhibited good technique with therapeutic exercises, and would benefit from continued PT      Plan: Continue per plan of care.  Follow up in 2 weeks with updated HEP      Precautions: none    Access Code: [email or web address]  URL: [email or web address]  Date: 2025  Prepared by: Lyric Peck    Exercises  - Supine Bridge with Resistance Band  - 1 x daily - 7 x weekly - 2 sets - 10 reps  - Supine Bridge with Mini Swiss Ball Between Knees  - 1 x daily - 7 x weekly - 2 sets - 10 reps  - Clamshell with Resistance  - 1 x daily - 7 x weekly - 2 sets - 10 reps  - Sidelying Reverse Clamshell with Resistance  - 1 x daily - 7 x weekly - 2 sets - 10 reps  - Supine Active Straight Leg Raise  - 1 x daily - 3-4 x weekly - 2-3 sets - 10 reps  - Sidelying Hip Abduction  - 1 x daily - 3-4 x weekly - 2-3 sets - 10 reps  - Prone Hip Extension  - 1 x daily - 3-4 x weekly - 2-3 sets - 10 reps  - Sidelying Hip Adduction  - 1 x daily - 3-4 x weekly - 2-3 sets - 10 reps  - Hip Abduction with Resistance Loop  - 1 x daily - 7 x weekly - 2 sets - 10 reps  - Hip Extension with Resistance Loop  - 1 x daily - 7 x weekly - 2 sets - 10 reps  - Side Stepping with Resistance at Ankles  - 1 x daily - 7 x weekly - 3 laps hold  Manuals                                                                 Neuro Re-Ed             Side steps bTB 3 laps            Monster walks bTB 3 laps            Lateral step up NP                                                    Hep review and pt education             Ther Ex             bike 5'            bridges 3x10 hip abd btb  3x10 hip add            clamshells 2x10 btb            Reverse clamshells 2x10 btb            SL hip abd             Heel push             Seated IR             Hip abd/ext 2x10 btb            LP             4 way sLR X10 ea                         Ther Activity                                       Gait Training                                       Modalities

## 2025-07-23 ENCOUNTER — OFFICE VISIT (OUTPATIENT)
Dept: PHYSICAL THERAPY | Facility: MEDICAL CENTER | Age: 35
End: 2025-07-23
Attending: FAMILY MEDICINE
Payer: COMMERCIAL

## 2025-07-23 DIAGNOSIS — M25.552 LEFT HIP PAIN: Primary | ICD-10-CM

## 2025-07-23 PROCEDURE — 97110 THERAPEUTIC EXERCISES: CPT

## 2025-07-23 NOTE — PROGRESS NOTES
Daily Note     Today's date: 2025  Patient name: Paty Ocasio  : 1990  MRN: [id number]  Referring provider: Teja Cho MD  Dx:   Encounter Diagnosis     ICD-10-CM    1. Left hip pain  M25.552                          Subjective: Pt denies any change since last visit.       Objective: See treatment diary below      Assessment: Patient is in agreement with plan to perform comprehensive HEP and follow up as needed over the next month. HEP was updated last session and she demonstrates good understanding of exercises.       Plan: HEP and follow up as needed over the next month.      Precautions: none    Access Code: [email or web address]  URL: [email or web address]  Date: 2025  Prepared by: Lyric Peck    Exercises  - Supine Bridge with Resistance Band  - 1 x daily - 7 x weekly - 2 sets - 10 reps  - Supine Bridge with Mini Swiss Ball Between Knees  - 1 x daily - 7 x weekly - 2 sets - 10 reps  - Clamshell with Resistance  - 1 x daily - 7 x weekly - 2 sets - 10 reps  - Sidelying Reverse Clamshell with Resistance  - 1 x daily - 7 x weekly - 2 sets - 10 reps  - Supine Active Straight Leg Raise  - 1 x daily - 3-4 x weekly - 2-3 sets - 10 reps  - Sidelying Hip Abduction  - 1 x daily - 3-4 x weekly - 2-3 sets - 10 reps  - Prone Hip Extension  - 1 x daily - 3-4 x weekly - 2-3 sets - 10 reps  - Sidelying Hip Adduction  - 1 x daily - 3-4 x weekly - 2-3 sets - 10 reps  - Hip Abduction with Resistance Loop  - 1 x daily - 7 x weekly - 2 sets - 10 reps  - Hip Extension with Resistance Loop  - 1 x daily - 7 x weekly - 2 sets - 10 reps  - Side Stepping with Resistance at Ankles  - 1 x daily - 7 x weekly - 3 laps hold  Manuals                                                                 Neuro Re-Ed             Side steps bTB 3 laps            Monster walks bTB 3 laps            Lateral step up NP                                                   Hep review and pt education              Ther Ex             bike 5'            bridges 3x10 hip abd btb  3x10 hip add            clamshells 2x10 btb            Reverse clamshells 2x10 btb            SL hip abd             Heel push             Seated IR             Hip abd/ext 2x10 btb            LP             4 way sLR 2X10 ea                         Ther Activity                                       Gait Training                                       Modalities

## 2025-07-25 ENCOUNTER — TELEPHONE (OUTPATIENT)
Age: 35
End: 2025-07-25

## 2025-07-25 DIAGNOSIS — E66.9 OBESITY (BMI 30-39.9): Primary | ICD-10-CM

## 2025-07-25 RX ORDER — SEMAGLUTIDE 0.5 MG/.5ML
INJECTION, SOLUTION SUBCUTANEOUS
Qty: 2 ML | Refills: 0 | Status: SHIPPED | OUTPATIENT
Start: 2025-07-25

## 2025-07-25 NOTE — TELEPHONE ENCOUNTER
Patient called the RX Refill Line. Message is being forwarded to the office.     Patient is requesting a refill on wegovy.  She is currently on 0.25mg and is interested in moving up in dose.  She is asymptomatic on present dose and her current weight is 181.  She has 1 injection left and will be taking that today.  If increase is appropriate, please send to [facility]    Please contact patient at [phone] with any questions

## 2025-08-22 DIAGNOSIS — E66.9 OBESITY (BMI 30-39.9): ICD-10-CM

## 2025-08-22 RX ORDER — SEMAGLUTIDE 1 MG/.5ML
INJECTION, SOLUTION SUBCUTANEOUS
Qty: 2 ML | Refills: 0 | Status: SHIPPED | OUTPATIENT
Start: 2025-08-22

## 2025-08-22 RX ORDER — SEMAGLUTIDE 0.5 MG/.5ML
INJECTION, SOLUTION SUBCUTANEOUS
Qty: 2 ML | Refills: 0 | Status: CANCELLED | OUTPATIENT
Start: 2025-08-22